# Patient Record
Sex: FEMALE | Race: WHITE | NOT HISPANIC OR LATINO | Employment: STUDENT | URBAN - METROPOLITAN AREA
[De-identification: names, ages, dates, MRNs, and addresses within clinical notes are randomized per-mention and may not be internally consistent; named-entity substitution may affect disease eponyms.]

---

## 2017-06-19 ENCOUNTER — TELEPHONE (OUTPATIENT)
Dept: FAMILY MEDICINE | Facility: CLINIC | Age: 22
End: 2017-06-19

## 2017-06-19 NOTE — TELEPHONE ENCOUNTER
----- Message from Benita Felisha sent at 6/19/2017  8:06 AM CDT -----  Regarding: appointment for this morning 6/19/17  Contact: [phone]  The father of the above-mentioned patient has stated his daughter needs to see either Dr. Méndez or Johana Myers this morning due to a sore throat she has been having for the last 2 days.  He can be reached at [phone].

## 2017-09-05 ENCOUNTER — TELEPHONE (OUTPATIENT)
Dept: FAMILY MEDICINE | Facility: CLINIC | Age: 22
End: 2017-09-05

## 2017-09-05 NOTE — TELEPHONE ENCOUNTER
Pt's mom called and said she tried to get an appt with you or Johana.  Only appt available is with Johana on 9-.  She has been complaining for 5-6 mths with chest pains.  They are getting worse and now she is crying and having bouts of anxiety.  She said her b/p has been elevated also.

## 2017-09-08 ENCOUNTER — OFFICE VISIT (OUTPATIENT)
Dept: FAMILY MEDICINE | Facility: CLINIC | Age: 22
End: 2017-09-08
Payer: COMMERCIAL

## 2017-09-08 VITALS
SYSTOLIC BLOOD PRESSURE: 134 MMHG | HEART RATE: 86 BPM | DIASTOLIC BLOOD PRESSURE: 96 MMHG | HEIGHT: 65 IN | BODY MASS INDEX: 17.47 KG/M2 | WEIGHT: 104.88 LBS | OXYGEN SATURATION: 99 %

## 2017-09-08 DIAGNOSIS — R07.9 CHEST PAIN AT REST: ICD-10-CM

## 2017-09-08 PROCEDURE — 99214 OFFICE O/P EST MOD 30 MIN: CPT | Mod: ,,, | Performed by: FAMILY MEDICINE

## 2017-09-08 PROCEDURE — 3008F BODY MASS INDEX DOCD: CPT | Mod: ,,, | Performed by: FAMILY MEDICINE

## 2017-09-08 NOTE — PATIENT INSTRUCTIONS

## 2017-09-08 NOTE — PROGRESS NOTES
Subjective:       Patient ID: Anastasia Champion is a 21 y.o. female.    Chief Complaint: Chest Pain (occuring for months); Hypertension (started checking it at home 3 weeks ago, has been running around 130s/90s); and Cough (productive in morning, started 3 days ago)    Chest Pain    This is a chronic problem. The current episode started more than 1 year ago. The onset quality is gradual. The problem occurs every several days. The problem has been unchanged. The pain is present in the substernal region. The pain is at a severity of 4/10. The pain is moderate. The quality of the pain is described as pressure. The pain does not radiate. Associated symptoms include a cough. Pertinent negatives include no abdominal pain, back pain, claudication, diaphoresis, dizziness, exertional chest pressure, fever, headaches, hemoptysis, malaise/fatigue, nausea, near-syncope, numbness, orthopnea, palpitations, PND, shortness of breath, sputum production, syncope, vomiting or weakness. The cough has no precipitants. Nothing relieves the cough. Nothing worsens the cough.   Hypertension   Associated symptoms include chest pain. Pertinent negatives include no headaches, malaise/fatigue, orthopnea, palpitations, PND or shortness of breath.   Cough   Associated symptoms include chest pain. Pertinent negatives include no chills, ear pain, fever, headaches, hemoptysis, myalgias, rash, sore throat or shortness of breath.     Review of Systems   Constitutional: Negative for appetite change, chills, diaphoresis, fatigue, fever and malaise/fatigue.   HENT: Negative for congestion, ear pain, hearing loss, sore throat and trouble swallowing.    Eyes: Negative for photophobia, pain and visual disturbance.   Respiratory: Positive for cough. Negative for hemoptysis, sputum production, chest tightness and shortness of breath.    Cardiovascular: Positive for chest pain. Negative for palpitations, orthopnea, claudication, leg swelling, syncope, PND  and near-syncope.   Gastrointestinal: Negative for abdominal pain, blood in stool, constipation, diarrhea, nausea and vomiting.   Endocrine: Negative for cold intolerance and heat intolerance.   Genitourinary: Negative for difficulty urinating, flank pain, pelvic pain and vaginal pain.   Musculoskeletal: Negative for arthralgias, back pain and myalgias.   Skin: Negative for rash.   Allergic/Immunologic: Negative for immunocompromised state.   Neurological: Negative for dizziness, weakness, light-headedness, numbness and headaches.   Hematological: Negative for adenopathy. Does not bruise/bleed easily.   Psychiatric/Behavioral: Negative for confusion, self-injury and suicidal ideas.       Past Medical History:   Diagnosis Date    Polycystic kidney     adult onset      Past Surgical History:   Procedure Laterality Date    ADENOIDECTOMY      TONSILLECTOMY      tubes in ears         Family History   Problem Relation Age of Onset    Hypertension Mother     Kidney disease Mother     Asthma Mother     Hypertension Maternal Grandfather     Kidney disease Maternal Grandfather        Social History     Social History    Marital status: Single     Spouse name: N/A    Number of children: N/A    Years of education: N/A     Social History Main Topics    Smoking status: Never Smoker    Smokeless tobacco: Never Used    Alcohol use No    Drug use: No    Sexual activity: Yes     Partners: Male     Birth control/ protection: Other-see comments      Comment: Birth control pill     Other Topics Concern    None     Social History Narrative    None       No current outpatient prescriptions on file.     No current facility-administered medications for this visit.        Review of patient's allergies indicates:  No Known Allergies  Objective:    HPI     Chest Pain    Additional comments: occuring for months           Hypertension    Additional comments: started checking it at home 3 weeks ago, has been   running around  "130s/90s           Cough    Additional comments: productive in morning, started 3 days ago       Last edited by Carolyne Hernandez LPN on 9/8/2017  8:21 AM. (History)      Blood pressure (!) 134/96, pulse 86, height 5' 5" (1.651 m), weight 47.6 kg (104 lb 14.4 oz), SpO2 99 %. Body mass index is 17.46 kg/m².   Physical Exam   Constitutional: She is oriented to person, place, and time. She appears well-developed and well-nourished. She is cooperative. No distress.   HENT:   Head: Normocephalic and atraumatic.   Right Ear: Tympanic membrane normal.   Left Ear: Tympanic membrane normal.   Eyes: Conjunctivae, EOM and lids are normal. Pupils are equal, round, and reactive to light. Lids are everted and swept, no foreign bodies found. Right pupil is round and reactive. Left pupil is round and reactive.   Neck: Trachea normal and normal range of motion. Neck supple.   Cardiovascular: Normal rate, regular rhythm, S1 normal, S2 normal and intact distal pulses.    Murmur (opening snap) heard.  Pulmonary/Chest: Breath sounds normal.   Abdominal: Soft. Bowel sounds are normal. There is no rigidity and no guarding.   Musculoskeletal: Normal range of motion.   Lymphadenopathy:     She has no cervical adenopathy.     She has no axillary adenopathy.   Neurological: She is alert and oriented to person, place, and time.   Skin: Skin is warm and dry. Capillary refill takes less than 2 seconds.   Psychiatric: She has a normal mood and affect. Her behavior is normal. Judgment and thought content normal.   Nursing note and vitals reviewed.          Assessment:       1. Chest pain at rest        Plan:       Anastasia was seen today for chest pain, hypertension and cough.    Diagnoses and all orders for this visit:    Chest pain at rest  Comments:  ekg nsr  Orders:  -     2D Echo w/ Color Flow Doppler; Future  -     IN OFFICE EKG 12-LEAD (to [facility])         "

## 2017-09-20 ENCOUNTER — TELEPHONE (OUTPATIENT)
Dept: FAMILY MEDICINE | Facility: CLINIC | Age: 22
End: 2017-09-20

## 2017-09-20 NOTE — TELEPHONE ENCOUNTER
----- Message from CHRISTIE éMndez MD sent at 9/20/2017  8:35 AM CDT -----   MVP. No need to treat , yanelis ov 6m  ----- Message -----  From: Carolyne Hernandez LPN  Sent: 9/20/2017   8:07 AM  To: CHRISTIE Méndez MD    echocardiogram

## 2017-12-21 ENCOUNTER — TELEPHONE (OUTPATIENT)
Dept: UROLOGY | Facility: CLINIC | Age: 22
End: 2017-12-21

## 2017-12-21 DIAGNOSIS — Q61.3 POLYCYSTIC KIDNEY: Primary | ICD-10-CM

## 2017-12-21 NOTE — TELEPHONE ENCOUNTER
The pt has not been seen since 8/2016  Was supposed to f/u but did not   -did she nephrology for her pckd, we referred her to them  -did she do the 24 hour urine, she should complete this after she gets her rbus and gives urine sample  -is she having flank pain  -recommend starting with a rbus and a urinalysis and culture

## 2017-12-21 NOTE — TELEPHONE ENCOUNTER
Spoke with patient's mom she states patient has polycystic kidney disease and has a history of kidney stone. Patient was seen by doctor at [facility] while she was in school and prescribed macrobid for uti. Patient has completed all antibiotics and urine is still very dark brown. No available appointments with  or NP. Please advise

## 2017-12-21 NOTE — TELEPHONE ENCOUNTER
----- Message from Liseth Purvis sent at 12/21/2017  1:18 PM CST -----  Mother (Apoorva)requesting to speak with nurse concerning patient's urine being very dark (stated patient has PKD)please call back at [phone] to advise.

## 2017-12-22 NOTE — TELEPHONE ENCOUNTER
Spoke with patient reinforced she should see a nephrologist which she has not done so. Patient also has not completed 24 hour urine. Informed her of 's recommendations of a renal ultrasound and urine sample. Patient states her mom declined this informed patient to give the office a call back if she decides to have this done. Reinforced again she should see a kidney doctor.

## 2017-12-22 NOTE — TELEPHONE ENCOUNTER
----- Message from Mehreen Hernandez sent at 12/22/2017  8:56 AM CST -----  Contact: Mother Apoorva   Mother Apoorva missed a call from your office, please call back at [phone]

## 2018-05-10 ENCOUNTER — OFFICE VISIT (OUTPATIENT)
Dept: FAMILY MEDICINE | Facility: CLINIC | Age: 23
End: 2018-05-10
Payer: COMMERCIAL

## 2018-05-10 VITALS
DIASTOLIC BLOOD PRESSURE: 70 MMHG | HEIGHT: 65 IN | TEMPERATURE: 99 F | HEART RATE: 109 BPM | OXYGEN SATURATION: 98 % | WEIGHT: 107 LBS | SYSTOLIC BLOOD PRESSURE: 108 MMHG | BODY MASS INDEX: 17.83 KG/M2

## 2018-05-10 DIAGNOSIS — J06.9 ACUTE URI: Primary | ICD-10-CM

## 2018-05-10 PROBLEM — N20.0 NEPHROLITHIASIS: Status: ACTIVE | Noted: 2018-05-10

## 2018-05-10 PROBLEM — Q61.3 POLYCYSTIC KIDNEY: Status: ACTIVE | Noted: 2018-05-10

## 2018-05-10 PROBLEM — R07.9 CHEST PAIN AT REST: Status: RESOLVED | Noted: 2017-09-08 | Resolved: 2018-05-10

## 2018-05-10 PROCEDURE — 99213 OFFICE O/P EST LOW 20 MIN: CPT | Mod: ,,, | Performed by: NURSE PRACTITIONER

## 2018-05-10 PROCEDURE — 3008F BODY MASS INDEX DOCD: CPT | Mod: ,,, | Performed by: NURSE PRACTITIONER

## 2018-05-10 RX ORDER — NORETHINDRONE ACETATE AND ETHINYL ESTRADIOL 1MG-20(21)
1 KIT ORAL DAILY
Refills: 12 | COMMUNITY
Start: 2018-04-29 | End: 2019-02-18

## 2018-05-10 RX ORDER — AMOXICILLIN AND CLAVULANATE POTASSIUM 875; 125 MG/1; MG/1
1 TABLET, FILM COATED ORAL 2 TIMES DAILY
Qty: 20 TABLET | Refills: 0 | Status: SHIPPED | OUTPATIENT
Start: 2018-05-10 | End: 2019-02-18

## 2018-05-10 NOTE — PATIENT INSTRUCTIONS

## 2018-05-10 NOTE — PROGRESS NOTES
SUBJECTIVE:      Patient ID: Anastasia Champion is a 22 y.o. female.    Chief Complaint: Sore Throat (sinus and ear crackling )    Anastasia is here today with c/o cough, congestion and body aches with ear pain for 5 days.      Sore Throat    This is a new problem. The current episode started in the past 7 days. The problem has been gradually worsening. The pain is worse on the right side. There has been no fever. The pain is mild. Associated symptoms include congestion, coughing, ear pain, headaches, a plugged ear sensation, neck pain, shortness of breath, swollen glands and trouble swallowing. Pertinent negatives include no abdominal pain, diarrhea, drooling, ear discharge, hoarse voice, stridor or vomiting. She has tried NSAIDs for the symptoms. The treatment provided moderate relief.       Past Surgical History:   Procedure Laterality Date    ADENOIDECTOMY      TONSILLECTOMY      tubes in ears       Family History   Problem Relation Age of Onset    Hypertension Mother     Kidney disease Mother     Asthma Mother     Hypertension Maternal Grandfather     Kidney disease Maternal Grandfather       Social History     Social History    Marital status: Single     Spouse name: N/A    Number of children: N/A    Years of education: N/A     Occupational History    student      Social History Main Topics    Smoking status: Never Smoker    Smokeless tobacco: Never Used    Alcohol use No    Drug use: No    Sexual activity: Yes     Partners: Male     Birth control/ protection: OCP      Comment: last PAP 06/2017     Other Topics Concern    None     Social History Narrative    None     Current Outpatient Prescriptions   Medication Sig Dispense Refill    BLISOVI FE 1/20, 28, 1 mg-20 mcg (21)/75 mg (7) per tablet Take 1 tablet by mouth once daily.  12    amoxicillin-clavulanate 875-125mg (AUGMENTIN) 875-125 mg per tablet Take 1 tablet by mouth 2 (two) times daily. 20 tablet 0     No current  "facility-administered medications for this visit.      Review of patient's allergies indicates:  No Known Allergies   Past Medical History:   Diagnosis Date    Nephrolithiasis 5/10/2018    Polycystic kidney     adult onset     Past Surgical History:   Procedure Laterality Date    ADENOIDECTOMY      TONSILLECTOMY      tubes in ears         Review of Systems   Constitutional: Positive for fatigue. Negative for activity change, appetite change, chills, diaphoresis, fever and unexpected weight change.   HENT: Positive for congestion, ear pain, sore throat and trouble swallowing. Negative for drooling, ear discharge, hoarse voice and rhinorrhea.    Eyes: Negative for pain and visual disturbance.   Respiratory: Positive for cough, shortness of breath and wheezing. Negative for apnea and stridor.    Cardiovascular: Negative for chest pain and palpitations.   Gastrointestinal: Negative for abdominal pain, constipation, diarrhea, nausea and vomiting.   Endocrine: Negative for polydipsia, polyphagia and polyuria.   Genitourinary: Negative for dysuria, frequency and urgency.   Musculoskeletal: Positive for neck pain. Negative for arthralgias, gait problem and myalgias.   Skin: Negative for color change, pallor and rash.   Allergic/Immunologic: Negative for immunocompromised state.   Neurological: Positive for headaches. Negative for dizziness, syncope and numbness.   Hematological: Negative for adenopathy.   Psychiatric/Behavioral: Negative for confusion, self-injury and suicidal ideas.      OBJECTIVE:      Vitals:    05/10/18 1508   BP: 108/70   Pulse: 109   Temp: 98.6 °F (37 °C)   SpO2: 98%   Weight: 48.5 kg (107 lb)   Height: 5' 5" (1.651 m)     Physical Exam   Constitutional: She is oriented to person, place, and time. She appears well-developed and well-nourished. No distress.   HENT:   Head: Normocephalic and atraumatic.   Right Ear: External ear and ear canal normal. Tympanic membrane is retracted. Tympanic " membrane is not erythematous. A middle ear effusion is present.   Left Ear: External ear and ear canal normal. Tympanic membrane is not erythematous.  No middle ear effusion.   Nose: Mucosal edema and rhinorrhea present. Right sinus exhibits no maxillary sinus tenderness and no frontal sinus tenderness. Left sinus exhibits no maxillary sinus tenderness and no frontal sinus tenderness.   Mouth/Throat: Uvula is midline. Posterior oropharyngeal edema and posterior oropharyngeal erythema present. No oropharyngeal exudate.   Eyes: Conjunctivae, EOM and lids are normal. Pupils are equal, round, and reactive to light. Right eye exhibits no discharge. Left eye exhibits no discharge. No scleral icterus.   Neck: Normal range of motion. Neck supple. Carotid bruit is not present. No thyromegaly present.   Cardiovascular: Normal rate, regular rhythm and normal heart sounds.    Pulmonary/Chest: Effort normal and breath sounds normal.   Abdominal: Soft. Bowel sounds are normal. There is no tenderness.   Musculoskeletal: Normal range of motion.   Lymphadenopathy:     She has no cervical adenopathy.   Neurological: She is alert and oriented to person, place, and time.   Skin: Skin is warm, dry and intact. She is not diaphoretic.   Psychiatric: She has a normal mood and affect. She expresses no suicidal plans.   Vitals reviewed.     Assessment:       1. Acute URI        Plan:       Acute URI   Symptoms are most likely due to viral infection.  Advised to take OTC medications such as tylenol and motrin for fever, Mucinex DM or similar for cough, antihistamine and/or decongestant for nasal symptoms.  Monitor blood pressure if a decongestant is used.  Get plenty of rest and fluids to maintain hydration. Gargle with warm salt water if sore throat is present.  An antibiotic Rx was provided in case symptoms worsen or due not resolve within 10-14 days.   -     amoxicillin-clavulanate 875-125mg (AUGMENTIN) 875-125 mg per tablet; Take 1 tablet  by mouth 2 (two) times daily.  Dispense: 20 tablet; Refill: 0        Follow-up if symptoms worsen or fail to improve.      5/10/2018 HARISH Chaney, JANEP

## 2018-05-23 ENCOUNTER — OFFICE VISIT (OUTPATIENT)
Dept: FAMILY MEDICINE | Facility: CLINIC | Age: 23
End: 2018-05-23
Payer: COMMERCIAL

## 2018-05-23 VITALS
WEIGHT: 116 LBS | HEIGHT: 65 IN | HEART RATE: 97 BPM | OXYGEN SATURATION: 98 % | TEMPERATURE: 100 F | BODY MASS INDEX: 19.33 KG/M2 | DIASTOLIC BLOOD PRESSURE: 60 MMHG | SYSTOLIC BLOOD PRESSURE: 120 MMHG

## 2018-05-23 DIAGNOSIS — B27.90 MONONUCLEOSIS: ICD-10-CM

## 2018-05-23 DIAGNOSIS — J02.9 PHARYNGITIS, UNSPECIFIED ETIOLOGY: Primary | ICD-10-CM

## 2018-05-23 LAB
CTP QC/QA: YES
CTP QC/QA: YES
HETEROPH AB SER QL: POSITIVE
S PYO RRNA THROAT QL PROBE: NEGATIVE

## 2018-05-23 PROCEDURE — 96372 THER/PROPH/DIAG INJ SC/IM: CPT | Mod: ,,, | Performed by: NURSE PRACTITIONER

## 2018-05-23 PROCEDURE — 86308 HETEROPHILE ANTIBODY SCREEN: CPT | Mod: QW,,, | Performed by: NURSE PRACTITIONER

## 2018-05-23 PROCEDURE — 99213 OFFICE O/P EST LOW 20 MIN: CPT | Mod: 25,,, | Performed by: NURSE PRACTITIONER

## 2018-05-23 PROCEDURE — 3008F BODY MASS INDEX DOCD: CPT | Mod: ,,, | Performed by: NURSE PRACTITIONER

## 2018-05-23 PROCEDURE — 87880 STREP A ASSAY W/OPTIC: CPT | Mod: QW,,, | Performed by: NURSE PRACTITIONER

## 2018-05-23 RX ORDER — DEXAMETHASONE SODIUM PHOSPHATE 4 MG/ML
8 INJECTION, SOLUTION INTRA-ARTICULAR; INTRALESIONAL; INTRAMUSCULAR; INTRAVENOUS; SOFT TISSUE
Status: COMPLETED | OUTPATIENT
Start: 2018-05-23 | End: 2018-05-23

## 2018-05-23 RX ORDER — IBUPROFEN 800 MG/1
800 TABLET ORAL 3 TIMES DAILY
Qty: 30 TABLET | Refills: 0 | Status: SHIPPED | OUTPATIENT
Start: 2018-05-23 | End: 2019-06-26

## 2018-05-23 RX ADMIN — DEXAMETHASONE SODIUM PHOSPHATE 8 MG: 4 INJECTION, SOLUTION INTRA-ARTICULAR; INTRALESIONAL; INTRAMUSCULAR; INTRAVENOUS; SOFT TISSUE at 03:05

## 2018-05-23 NOTE — PATIENT INSTRUCTIONS
Mononucleosis (Mono)  Mononucleosis, also known as mono, is caused by the Josue-Barr virus. Mono is best known for causing swollen glands and tiredness, but it can cause other symptoms as well. Most children with mono recover without any problems. But the illness can take a long time to go away. In some cases, mono can cause problems with the liver, spleen, or heart. So it is important to diagnose mono and to watch your child carefully.  How mono is spread  Mono can be easily transmitted from an infected person's saliva by:  · Drinking and eating after them  · Sharing a straw, cup, toothbrushes, and eating utensils  · Kissing and close contact  · Handling toys with children drool  Symptoms of mono     The best treatment for mono is plenty of rest.   In children, common symptoms include:  · Tiredness, weakness  · Fever  · Sore throat  · Tender or swollen lymph nodes in the neck or armpits  · Swollen tonsils  · Rash  · Sore muscles or stiffness  · Headache  · Loss of appetite, nausea  · Dull pain in the stomach area  · Enlarged liver and spleen  · Headaches  · Puffy eyes  · Sensitivity to light  Treating mono  Because it is a viral infection, antibiotics wont cure mono. Your child's healthcare provider may prescribe medicines to help ease your child's pain or discomfort. The best treatment for mono is rest. A child with mono should also drink lots of fluids. To help your child feel better and recover sooner:  · Make sure your child gets enough rest.  · Provide plenty of fluids, such as water or apple juice.  · The spleen may become enlarged with mono. Your child may need to avoid contact sports, and heavy lifting for a while in order to prevent injury to the spleen. Discuss this with your child's healthcare provider.  · Treat fever, sore throat, headache, or aching muscles with acetaminophen or ibuprofen. Never give your child aspirin. Your child's healthcare provider or nurse can help you with the correct  dose.  Symptoms usually last for a few weeks, but can sometimes last for 1 to 2 months or longer. Even after symptoms go away, your child may be tired or weak for some time.  Preventing the spread of mono  While youre caring for a child with mono:  · Wash your hands with warm water and soap often, especially before and after tending to your sick child.  · Monitor your own health and that of other family members.  · Limit a sick childs contact with other children.  · Clean dishes and eating utensils used by a sick child separately in very hot, soapy water. Or run them through the .  When to seek medical care  Call your childs healthcare provider right away if your otherwise healthy child:  · Is younger than 3 months and has a fever of 100.4°F (38°C) or higher  · Is younger than 2 years old and has a fever that lasts more than 24 hours  · Is 2 years old or older and the fever continues for 3 days  · Has repeated fevers above 104°F (40°C) at any age  · Has had a seizure caused by the fever  · Experiences difficult or rapid breathing  · Cant be soothed or shows signs of irritability or restlessness  · Seems unusually drowsy, listless, or unresponsive  · Has trouble eating, drinking, or swallowing  · Stops breathing, even for an instant  · Shows signs of severe chest, neck, or belly pain  Date Last Reviewed: 12/1/2016  © 3073-1223 [employer]. [address] [place] [address], [place] [address]. All rights reserved. This information is not intended as a substitute for professional medical care. Always follow your healthcare professional's instructions.

## 2019-02-18 ENCOUNTER — OFFICE VISIT (OUTPATIENT)
Dept: FAMILY MEDICINE | Facility: CLINIC | Age: 24
End: 2019-02-18
Payer: COMMERCIAL

## 2019-02-18 VITALS
WEIGHT: 106.13 LBS | DIASTOLIC BLOOD PRESSURE: 80 MMHG | HEART RATE: 89 BPM | HEIGHT: 65 IN | OXYGEN SATURATION: 99 % | SYSTOLIC BLOOD PRESSURE: 110 MMHG | BODY MASS INDEX: 17.68 KG/M2

## 2019-02-18 DIAGNOSIS — H69.93 DYSFUNCTION OF BOTH EUSTACHIAN TUBES: Primary | ICD-10-CM

## 2019-02-18 PROCEDURE — 99213 PR OFFICE/OUTPT VISIT, EST, LEVL III, 20-29 MIN: ICD-10-PCS | Mod: ,,, | Performed by: NURSE PRACTITIONER

## 2019-02-18 PROCEDURE — 99213 OFFICE O/P EST LOW 20 MIN: CPT | Mod: ,,, | Performed by: NURSE PRACTITIONER

## 2019-02-18 PROCEDURE — 3008F PR BODY MASS INDEX (BMI) DOCUMENTED: ICD-10-PCS | Mod: ,,, | Performed by: NURSE PRACTITIONER

## 2019-02-18 PROCEDURE — 3008F BODY MASS INDEX DOCD: CPT | Mod: ,,, | Performed by: NURSE PRACTITIONER

## 2019-02-18 RX ORDER — MOMETASONE FUROATE 50 UG/1
2 SPRAY, METERED NASAL DAILY
Qty: 17 G | Refills: 1 | Status: SHIPPED | OUTPATIENT
Start: 2019-02-18 | End: 2019-06-26

## 2019-02-18 NOTE — PROGRESS NOTES
SUBJECTIVE:      Patient ID: Anastasia Champion is a 23 y.o. female.    Chief Complaint: Otalgia (at night x 1 year)    Anastasia is here today with c/o intermittent ear pain, especially at night and when flying or riding in elevators.  She has not taken any medication for the problem.      Otalgia    There is pain in both ears. This is a chronic problem. The current episode started more than 1 year ago. Episode frequency: almost daily. The problem has been waxing and waning. There has been no fever. The pain is severe. Pertinent negatives include no abdominal pain, coughing, diarrhea, ear discharge, headaches, hearing loss, rash, rhinorrhea, sore throat or vomiting. She has tried nothing for the symptoms. Her past medical history is significant for a chronic ear infection and a tympanostomy tube.       Past Surgical History:   Procedure Laterality Date    ADENOIDECTOMY      TONSILLECTOMY      tubes in ears       Family History   Problem Relation Age of Onset    Hypertension Mother     Kidney disease Mother     Asthma Mother     Hypertension Maternal Grandfather     Kidney disease Maternal Grandfather       Social History     Socioeconomic History    Marital status: Single     Spouse name: None    Number of children: None    Years of education: None    Highest education level: None   Social Needs    Financial resource strain: None    Food insecurity - worry: None    Food insecurity - inability: None    Transportation needs - medical: None    Transportation needs - non-medical: None   Occupational History    Occupation: student   Tobacco Use    Smoking status: Never Smoker    Smokeless tobacco: Never Used   Substance and Sexual Activity    Alcohol use: No    Drug use: No    Sexual activity: Yes     Partners: Male     Birth control/protection: OCP     Comment: last PAP 06/2017   Other Topics Concern    None   Social History Narrative    None     Current Outpatient Medications  "  Medication Sig Dispense Refill    ibuprofen (ADVIL,MOTRIN) 800 MG tablet Take 1 tablet (800 mg total) by mouth 3 (three) times daily. 30 tablet 0    mometasone (NASONEX) 50 mcg/actuation nasal spray 2 sprays by Nasal route once daily. 17 g 1     No current facility-administered medications for this visit.      Review of patient's allergies indicates:  No Known Allergies   Past Medical History:   Diagnosis Date    Nephrolithiasis 5/10/2018    Polycystic kidney     adult onset     Past Surgical History:   Procedure Laterality Date    ADENOIDECTOMY      TONSILLECTOMY      tubes in ears         Review of Systems   Constitutional: Negative for activity change, appetite change, chills, diaphoresis, fatigue, fever and unexpected weight change.   HENT: Positive for ear pain. Negative for congestion, ear discharge, hearing loss, nosebleeds, rhinorrhea, sore throat and voice change.    Eyes: Negative for pain, discharge and visual disturbance.   Respiratory: Negative for apnea, cough, shortness of breath and wheezing.    Cardiovascular: Negative for chest pain and palpitations.   Gastrointestinal: Negative for abdominal pain, constipation, diarrhea, nausea and vomiting.   Genitourinary: Negative for difficulty urinating.   Skin: Negative for color change, pallor and rash.   Neurological: Negative for dizziness, syncope and headaches.   Hematological: Negative for adenopathy.   Psychiatric/Behavioral: Negative for dysphoric mood, self-injury and suicidal ideas.      OBJECTIVE:      Vitals:    02/18/19 1449   BP: 110/80   Pulse: 89   SpO2: 99%   Weight: 48.1 kg (106 lb 1.6 oz)   Height: 5' 5" (1.651 m)     Physical Exam   Constitutional: She is oriented to person, place, and time. She appears well-developed and well-nourished. No distress.   HENT:   Head: Normocephalic and atraumatic.   Right Ear: External ear and ear canal normal. Tympanic membrane is scarred. A middle ear effusion is present.   Left Ear: External ear " and ear canal normal. Tympanic membrane is scarred. A middle ear effusion is present.   Nose: Right sinus exhibits no maxillary sinus tenderness and no frontal sinus tenderness. Left sinus exhibits no maxillary sinus tenderness and no frontal sinus tenderness.   Mouth/Throat: Uvula is midline and oropharynx is clear and moist. No oropharyngeal exudate, posterior oropharyngeal edema or posterior oropharyngeal erythema.   Eyes: Conjunctivae, EOM and lids are normal. Pupils are equal, round, and reactive to light. Right eye exhibits no discharge. Left eye exhibits no discharge. No scleral icterus.   Neck: Normal range of motion. Neck supple. Carotid bruit is not present. No thyromegaly present.   Cardiovascular: Normal rate, regular rhythm, normal heart sounds and intact distal pulses. Exam reveals no gallop and no friction rub.   No murmur heard.  Pulmonary/Chest: Effort normal and breath sounds normal. No stridor. No respiratory distress. She has no wheezes. She has no rales.   Musculoskeletal: Normal range of motion.   Lymphadenopathy:     She has no cervical adenopathy.   Neurological: She is alert and oriented to person, place, and time.   Skin: Skin is warm, dry and intact. She is not diaphoretic.   Psychiatric: She has a normal mood and affect. Her behavior is normal. Judgment and thought content normal. She expresses no suicidal plans.      Assessment:       1. Dysfunction of both eustachian tubes        Plan:       Dysfunction of both eustachian tubes  -     mometasone (NASONEX) 50 mcg/actuation nasal spray; 2 sprays by Nasal route once daily.  Dispense: 17 g; Refill: 1   Mucinex and claritin D12 hour OTC; discussed steroids if no improvement 14 days      Follow-up if symptoms worsen or fail to improve.      2/18/2019 Johana Myers, APRN, FNP

## 2019-02-18 NOTE — PATIENT INSTRUCTIONS

## 2019-03-06 ENCOUNTER — PATIENT MESSAGE (OUTPATIENT)
Dept: FAMILY MEDICINE | Facility: CLINIC | Age: 24
End: 2019-03-06

## 2019-03-06 DIAGNOSIS — H69.93 ETD (EUSTACHIAN TUBE DYSFUNCTION), BILATERAL: Primary | ICD-10-CM

## 2019-03-07 RX ORDER — PREDNISONE 20 MG/1
60 TABLET ORAL DAILY
Qty: 15 TABLET | Refills: 0 | Status: SHIPPED | OUTPATIENT
Start: 2019-03-07 | End: 2019-04-17 | Stop reason: ALTCHOICE

## 2019-04-17 ENCOUNTER — OFFICE VISIT (OUTPATIENT)
Dept: FAMILY MEDICINE | Facility: CLINIC | Age: 24
End: 2019-04-17
Payer: COMMERCIAL

## 2019-04-17 VITALS
HEART RATE: 78 BPM | DIASTOLIC BLOOD PRESSURE: 82 MMHG | BODY MASS INDEX: 18.11 KG/M2 | OXYGEN SATURATION: 100 % | HEIGHT: 65 IN | WEIGHT: 108.69 LBS | SYSTOLIC BLOOD PRESSURE: 116 MMHG

## 2019-04-17 DIAGNOSIS — J02.9 PHARYNGITIS, UNSPECIFIED ETIOLOGY: Primary | ICD-10-CM

## 2019-04-17 LAB
CTP QC/QA: YES
S PYO RRNA THROAT QL PROBE: NEGATIVE

## 2019-04-17 PROCEDURE — 99213 PR OFFICE/OUTPT VISIT, EST, LEVL III, 20-29 MIN: ICD-10-PCS | Mod: ,,, | Performed by: NURSE PRACTITIONER

## 2019-04-17 PROCEDURE — 87880 STREP A ASSAY W/OPTIC: CPT | Mod: QW,,, | Performed by: NURSE PRACTITIONER

## 2019-04-17 PROCEDURE — 99213 OFFICE O/P EST LOW 20 MIN: CPT | Mod: ,,, | Performed by: NURSE PRACTITIONER

## 2019-04-17 PROCEDURE — 3008F PR BODY MASS INDEX (BMI) DOCUMENTED: ICD-10-PCS | Mod: ,,, | Performed by: NURSE PRACTITIONER

## 2019-04-17 PROCEDURE — 87880 POCT RAPID STREP A: ICD-10-PCS | Mod: QW,,, | Performed by: NURSE PRACTITIONER

## 2019-04-17 PROCEDURE — 3008F BODY MASS INDEX DOCD: CPT | Mod: ,,, | Performed by: NURSE PRACTITIONER

## 2019-04-17 RX ORDER — AZITHROMYCIN 250 MG/1
TABLET, FILM COATED ORAL
Qty: 6 TABLET | Refills: 0 | Status: SHIPPED | OUTPATIENT
Start: 2019-04-17 | End: 2019-04-22

## 2019-04-17 RX ORDER — NORETHINDRONE ACETATE AND ETHINYL ESTRADIOL 1MG-20(21)
1 KIT ORAL DAILY
Refills: 3 | COMMUNITY
Start: 2019-03-03

## 2019-04-17 NOTE — PATIENT INSTRUCTIONS
Viral Pharyngitis (Sore Throat)    You (or your child, if your child is the patient) have pharyngitis (sore throat). This infection is caused by a virus. It can cause throat pain that is worse when swallowing, aching all over, headache, and fever. The infection may be spread by coughing, kissing, or touching others after touching your mouth or nose. Antibiotic medications do not work against viruses, so they are not used for treating this condition.  Home care  · If your symptoms are severe, rest at home. Return to work or school when you feel well enough.   · Drink plenty of fluids to avoid dehydration.  · For children: Use acetaminophen for fever, fussiness or discomfort. In infants over six months of age, you may use ibuprofen instead of acetaminophen. (NOTE: If your child has chronic liver or kidney disease or ever had a stomach ulcer or GI bleeding, talk with your doctor before using these medicines.) (NOTE: Aspirin should never be used in anyone under 18 years of age who is ill with a fever. It may cause severe liver damage.)   · For adults: You may use acetaminophen or ibuprofen to control pain or fever, unless another medicine was prescribed for this. (NOTE: If you have chronic liver or kidney disease or ever had a stomach ulcer or GI bleeding, talk with your doctor before using these medicines.)  · Throat lozenges or numbing throat sprays can help reduce pain. Gargling with warm salt water will also help reduce throat pain. For this, dissolve 1/2 teaspoon of salt in 1 glass of warm water. To help soothe a sore throat, children can sip on juice or a popsicle. Children 5 years and older can also suck on a lollipop or hard candy.  · Avoid salty or spicy foods, which can be irritating to the throat.  Follow-up care  Follow up with your healthcare provider or our staff if you are not improving over the next week.  When to seek medical advice  Call your healthcare provider right away if any of these  occur:  · Fever as directed by your doctor.  For children, seek care if:  ¨ Your child is of any age and has repeated fevers above 104°F (40°C).  ¨ Your child is younger than 2 years of age and has a fever of 100.4°F (38°C) that continues for more than 1 day.  ¨ Your child is 2 years old or older and has a fever of 100.4°F (38°C) that continues for more than 3 days.  · New or worsening ear pain, sinus pain, or headache  · Painful lumps in the back of neck  · Stiff neck  · Lymph nodes are getting larger  · Inability to swallow liquids, excessive drooling, or inability to open mouth wide due to throat pain  · Signs of dehydration (very dark urine or no urine, sunken eyes, dizziness)  · Trouble breathing or noisy breathing  · Muffled voice  · New rash  · Child appears to be getting sicker  Date Last Reviewed: 4/13/2015  © 1153-8679 [employer] [x+1]. [address] [place] [address], [place] [address]. All rights reserved. This information is not intended as a substitute for professional medical care. Always follow your healthcare professional's instructions.

## 2019-04-17 NOTE — PROGRESS NOTES
SUBJECTIVE:      Patient ID: Anastasia Champion is a 23 y.o. female.    Chief Complaint: URI (x 3 days)    Sore Throat    This is a new problem. The current episode started in the past 7 days. The problem has been unchanged. Neither side of throat is experiencing more pain than the other. There has been no fever. The pain is severe (especially in am). Associated symptoms include congestion, coughing and trouble swallowing. Pertinent negatives include no abdominal pain, diarrhea, drooling, ear discharge, ear pain, headaches, hoarse voice, plugged ear sensation, neck pain, shortness of breath, stridor, swollen glands or vomiting.       Past Surgical History:   Procedure Laterality Date    ADENOIDECTOMY      TONSILLECTOMY      tubes in ears       Family History   Problem Relation Age of Onset    Hypertension Mother     Kidney disease Mother     Asthma Mother     Hypertension Maternal Grandfather     Kidney disease Maternal Grandfather       Social History     Socioeconomic History    Marital status: Single     Spouse name: Not on file    Number of children: Not on file    Years of education: Not on file    Highest education level: Not on file   Occupational History    Occupation: student   Social Needs    Financial resource strain: Not on file    Food insecurity:     Worry: Not on file     Inability: Not on file    Transportation needs:     Medical: Not on file     Non-medical: Not on file   Tobacco Use    Smoking status: Never Smoker    Smokeless tobacco: Never Used   Substance and Sexual Activity    Alcohol use: No    Drug use: No    Sexual activity: Yes     Partners: Male     Birth control/protection: OCP     Comment: last PAP 06/2017   Lifestyle    Physical activity:     Days per week: Not on file     Minutes per session: Not on file    Stress: Not on file   Relationships    Social connections:     Talks on phone: Not on file     Gets together: Not on file     Attends Hinduism  service: Not on file     Active member of club or organization: Not on file     Attends meetings of clubs or organizations: Not on file     Relationship status: Not on file   Other Topics Concern    Not on file   Social History Narrative    Not on file     Current Outpatient Medications   Medication Sig Dispense Refill    BLISOVI FE 1/20, 28, 1 mg-20 mcg (21)/75 mg (7) per tablet Take 1 tablet by mouth once daily.  3    azithromycin (Z-ASHISH) 250 MG tablet Take 2 tablets by mouth on day 1; Take 1 tablet by mouth on days 2-5 6 tablet 0    ibuprofen (ADVIL,MOTRIN) 800 MG tablet Take 1 tablet (800 mg total) by mouth 3 (three) times daily. 30 tablet 0    mometasone (NASONEX) 50 mcg/actuation nasal spray 2 sprays by Nasal route once daily. 17 g 1     No current facility-administered medications for this visit.      Review of patient's allergies indicates:  No Known Allergies   Past Medical History:   Diagnosis Date    Nephrolithiasis 5/10/2018    Polycystic kidney     adult onset     Past Surgical History:   Procedure Laterality Date    ADENOIDECTOMY      TONSILLECTOMY      tubes in ears         Review of Systems   Constitutional: Negative for activity change, chills, diaphoresis, fatigue, fever and unexpected weight change.   HENT: Positive for congestion, sore throat and trouble swallowing. Negative for drooling, ear discharge, ear pain, hoarse voice, nosebleeds, postnasal drip, sinus pressure and voice change.    Eyes: Negative for photophobia, redness, itching and visual disturbance.   Respiratory: Positive for cough. Negative for apnea, choking, shortness of breath, wheezing and stridor.    Cardiovascular: Negative for chest pain, palpitations and leg swelling.   Gastrointestinal: Negative for abdominal distention, abdominal pain, diarrhea and vomiting.   Musculoskeletal: Negative for neck pain.   Neurological: Negative for headaches.      OBJECTIVE:      Vitals:    04/17/19 1611   BP: 116/82   Pulse: 78  "  SpO2: 100%   Weight: 49.3 kg (108 lb 11.2 oz)   Height: 5' 5" (1.651 m)     Physical Exam   Constitutional: She is oriented to person, place, and time. She appears well-developed and well-nourished. No distress.   HENT:   Head: Normocephalic and atraumatic.   Right Ear: External ear and ear canal normal. Tympanic membrane is scarred.   Left Ear: Tympanic membrane, external ear and ear canal normal.   Nose: Nose normal. No mucosal edema or rhinorrhea. Right sinus exhibits no maxillary sinus tenderness and no frontal sinus tenderness. Left sinus exhibits no maxillary sinus tenderness and no frontal sinus tenderness.   Mouth/Throat: Uvula is midline. Posterior oropharyngeal edema and posterior oropharyngeal erythema present. No oropharyngeal exudate.   Eyes: Pupils are equal, round, and reactive to light. Conjunctivae, EOM and lids are normal. Right eye exhibits no discharge. Left eye exhibits no discharge. No scleral icterus.   Neck: Normal range of motion. Neck supple. Carotid bruit is not present. No tracheal deviation present. No thyromegaly present.   Cardiovascular: Normal rate, regular rhythm and normal heart sounds. Exam reveals no gallop and no friction rub.   No murmur heard.  Pulmonary/Chest: Effort normal and breath sounds normal. No stridor. No respiratory distress. She has no wheezes. She has no rales.   Abdominal: Soft. Bowel sounds are normal. She exhibits no distension and no mass. There is no hepatosplenomegaly. There is no tenderness. There is no rigidity, no rebound, no guarding and no CVA tenderness.   Musculoskeletal: Normal range of motion.   Lymphadenopathy:     She has no cervical adenopathy.   Neurological: She is alert and oriented to person, place, and time.   Skin: Skin is warm, dry and intact. She is not diaphoretic.   Psychiatric: She has a normal mood and affect. She expresses no suicidal plans.   Vitals reviewed.     Assessment:       1. Pharyngitis, unspecified etiology        Plan: "       Pharyngitis, unspecified etiology  -     POCT rapid strep A-negative  Likely viral; symptom control with OTC meds; Z-ashish if no improvement in 7-10 days.  -     azithromycin (Z-ASHISH) 250 MG tablet; Take 2 tablets by mouth on day 1; Take 1 tablet by mouth on days 2-5  Dispense: 6 tablet; Refill: 0        Follow up if symptoms worsen or fail to improve.      4/17/2019 HARISH Chaney, FNP

## 2019-06-26 ENCOUNTER — OFFICE VISIT (OUTPATIENT)
Dept: FAMILY MEDICINE | Facility: CLINIC | Age: 24
End: 2019-06-26
Payer: COMMERCIAL

## 2019-06-26 VITALS
HEART RATE: 66 BPM | OXYGEN SATURATION: 98 % | DIASTOLIC BLOOD PRESSURE: 72 MMHG | WEIGHT: 113 LBS | BODY MASS INDEX: 18.83 KG/M2 | SYSTOLIC BLOOD PRESSURE: 112 MMHG | HEIGHT: 65 IN

## 2019-06-26 DIAGNOSIS — L65.9 HAIR LOSS: Primary | ICD-10-CM

## 2019-06-26 PROCEDURE — 3008F BODY MASS INDEX DOCD: CPT | Mod: ,,, | Performed by: INTERNAL MEDICINE

## 2019-06-26 PROCEDURE — 3008F PR BODY MASS INDEX (BMI) DOCUMENTED: ICD-10-PCS | Mod: ,,, | Performed by: INTERNAL MEDICINE

## 2019-06-26 PROCEDURE — 99212 PR OFFICE/OUTPT VISIT, EST, LEVL II, 10-19 MIN: ICD-10-PCS | Mod: ,,, | Performed by: INTERNAL MEDICINE

## 2019-06-26 PROCEDURE — 99212 OFFICE O/P EST SF 10 MIN: CPT | Mod: ,,, | Performed by: INTERNAL MEDICINE

## 2019-06-26 NOTE — PROGRESS NOTES
Subjective:       Patient ID: Anastasia Champion is a 23 y.o. female.    Chief Complaint: Hair Loss    Here for evaluation of hair loss which she has noticed for several months which has worsened over the last month; coming out in clumps when she is in the shower.  No recent illness or recent emotional trauma.  She had stopped her OCP about 2 months ago; restarted it about 3 weeks ago to see if that would make a difference but no changes noted.  She has never dyed her hair and doesn't get perms, use curling irons, etc.  Scalp seems to be itching more than usual.   She brought copies of labs from 3 months ago; CMP, TSH normal.  She was menses which last up to 12 days when not on OCP.      Review of Systems   Constitutional: Negative for chills, fever and unexpected weight change.   HENT: Negative for congestion, postnasal drip, rhinorrhea and sinus pressure.    Respiratory: Negative for cough, chest tightness, shortness of breath and wheezing.    Cardiovascular: Negative for chest pain, palpitations and leg swelling.   Gastrointestinal: Negative for abdominal distention, abdominal pain, blood in stool, constipation, diarrhea, nausea and vomiting.   Endocrine: Positive for cold intolerance. Negative for heat intolerance, polydipsia and polyuria.   Genitourinary: Negative for difficulty urinating, dysuria, frequency, urgency and vaginal discharge.   Musculoskeletal: Negative for joint swelling and myalgias.   Skin: Negative for rash.   Neurological: Negative for dizziness, tremors, light-headedness and numbness.   Psychiatric/Behavioral: Negative for dysphoric mood and suicidal ideas. The patient is not nervous/anxious.        Past Medical History:   Diagnosis Date    Nephrolithiasis 5/10/2018    Polycystic kidney     congenital      Past Surgical History:   Procedure Laterality Date    ADENOIDECTOMY      TONSILLECTOMY      TYMPANOSTOMY TUBE PLACEMENT         Family History   Problem Relation Age of Onset     "Hypertension Mother     Kidney disease Mother         polycystic kidney disease    Asthma Mother     Hypertension Maternal Grandfather     Kidney disease Maternal Grandfather         polycystic kidney disease       Social History     Socioeconomic History    Marital status: Single     Spouse name: Not on file    Number of children: Not on file    Years of education: Not on file    Highest education level: Not on file   Occupational History    Occupation: student   Social Needs    Financial resource strain: Not on file    Food insecurity:     Worry: Not on file     Inability: Not on file    Transportation needs:     Medical: Not on file     Non-medical: Not on file   Tobacco Use    Smoking status: Never Smoker    Smokeless tobacco: Never Used   Substance and Sexual Activity    Alcohol use: No    Drug use: No    Sexual activity: Yes     Partners: Male     Birth control/protection: OCP     Comment: last PAP 08/2018   Lifestyle    Physical activity:     Days per week: Not on file     Minutes per session: Not on file    Stress: Not on file   Relationships    Social connections:     Talks on phone: Not on file     Gets together: Not on file     Attends Cheondoism service: Not on file     Active member of club or organization: Not on file     Attends meetings of clubs or organizations: Not on file     Relationship status: Not on file   Other Topics Concern    Not on file   Social History Narrative    Not on file       Current Outpatient Medications   Medication Sig Dispense Refill    BLISOVI FE 1/20, 28, 1 mg-20 mcg (21)/75 mg (7) per tablet Take 1 tablet by mouth once daily.  3     No current facility-administered medications for this visit.        Review of patient's allergies indicates:  No Known Allergies  Objective:      Blood pressure 112/72, pulse 66, height 5' 5" (1.651 m), weight 51.3 kg (113 lb), SpO2 98 %. Body mass index is 18.8 kg/m².   Physical Exam   Constitutional: She appears " well-developed and well-nourished.   Cardiovascular: Normal rate and regular rhythm. Exam reveals no gallop and no friction rub.   No murmur heard.  Pulmonary/Chest: Effort normal and breath sounds normal. No respiratory distress.   Abdominal: Soft. She exhibits no distension. There is no tenderness.   Skin:   No obvious scalp abnormalities or alopecia.  <10 strands of hair comes out with running hands through hair.   Nursing note and vitals reviewed.          Assessment:       1. Hair loss        Plan:       Anastasia was seen today for hair loss.    Diagnoses and all orders for this visit:    Hair loss  Comments:  Will check RPR and assess for iron defiency.   Orders:  -     CBC auto differential; Future  -     Ferritin; Future  -     Iron; Future  -     RPR; Future  -     CBC auto differential  -     Ferritin  -     Iron  -     RPR  -     Ambulatory referral to Dermatology

## 2020-07-20 DIAGNOSIS — Q61.3 CONGENITAL POLYCYSTIC KIDNEY DISEASE: Primary | ICD-10-CM

## 2021-05-10 ENCOUNTER — PATIENT MESSAGE (OUTPATIENT)
Dept: RESEARCH | Facility: HOSPITAL | Age: 26
End: 2021-05-10